# Patient Record
Sex: FEMALE | Race: WHITE | NOT HISPANIC OR LATINO
[De-identification: names, ages, dates, MRNs, and addresses within clinical notes are randomized per-mention and may not be internally consistent; named-entity substitution may affect disease eponyms.]

---

## 2017-08-07 ENCOUNTER — APPOINTMENT (OUTPATIENT)
Dept: GASTROENTEROLOGY | Facility: CLINIC | Age: 62
End: 2017-08-07

## 2021-03-29 DIAGNOSIS — Z01.818 ENCOUNTER FOR OTHER PREPROCEDURAL EXAMINATION: ICD-10-CM

## 2021-04-02 ENCOUNTER — APPOINTMENT (OUTPATIENT)
Dept: DISASTER EMERGENCY | Facility: CLINIC | Age: 66
End: 2021-04-02

## 2021-04-03 LAB — SARS-COV-2 N GENE NPH QL NAA+PROBE: NOT DETECTED

## 2021-06-22 ENCOUNTER — APPOINTMENT (OUTPATIENT)
Dept: ORTHOPEDIC SURGERY | Facility: CLINIC | Age: 66
End: 2021-06-22

## 2021-07-23 ENCOUNTER — APPOINTMENT (OUTPATIENT)
Dept: ORTHOPEDIC SURGERY | Facility: CLINIC | Age: 66
End: 2021-07-23
Payer: OTHER MISCELLANEOUS

## 2021-07-23 VITALS
HEIGHT: 61 IN | HEART RATE: 71 BPM | BODY MASS INDEX: 20.77 KG/M2 | WEIGHT: 110 LBS | DIASTOLIC BLOOD PRESSURE: 76 MMHG | SYSTOLIC BLOOD PRESSURE: 166 MMHG

## 2021-07-23 DIAGNOSIS — M25.561 PAIN IN RIGHT KNEE: ICD-10-CM

## 2021-07-23 DIAGNOSIS — M17.11 UNILATERAL PRIMARY OSTEOARTHRITIS, RIGHT KNEE: ICD-10-CM

## 2021-07-23 DIAGNOSIS — S83.241A OTHER TEAR OF MEDIAL MENISCUS, CURRENT INJURY, RIGHT KNEE, INITIAL ENCOUNTER: ICD-10-CM

## 2021-07-23 DIAGNOSIS — Z86.79 PERSONAL HISTORY OF OTHER DISEASES OF THE CIRCULATORY SYSTEM: ICD-10-CM

## 2021-07-23 DIAGNOSIS — Z87.19 PERSONAL HISTORY OF OTHER DISEASES OF THE DIGESTIVE SYSTEM: ICD-10-CM

## 2021-07-23 PROCEDURE — 99204 OFFICE O/P NEW MOD 45 MIN: CPT

## 2021-07-23 PROCEDURE — 99072 ADDL SUPL MATRL&STAF TM PHE: CPT

## 2021-07-23 PROCEDURE — 73564 X-RAY EXAM KNEE 4 OR MORE: CPT | Mod: RT

## 2021-07-23 RX ORDER — HYALURONATE SODIUM 10 MG/ML
25 SYRINGE (ML) INTRAARTICULAR
Qty: 5 | Refills: 0 | Status: ACTIVE | OUTPATIENT
Start: 2021-07-23

## 2021-07-23 NOTE — HISTORY OF PRESENT ILLNESS
[de-identified] : Ms. FELICE LOPEZ is a 66 year old female presenting for workers comp evaluation of right knee pain.  Patient states on 9/2/2020 she was at work and twisted her knee, causing excruciating pain to the medial aspect of the knee which progressively worsened throughout that day.  She notes swelling at the time of the incident.  Patient went to orthopedist who stated this is likely arthritis flareup and treated conservatively with an aspiration and injection of cortisone in September 2020 without significant relief.  She has tried some therapy and home exercise without relief.  Patient takes over-the-counter Advil without lasting improvement.  Her knee pain is localized medially and posteriorly.  She notes the pain is worse with all weightbearing activity as well as towards the end of the day.  She went for an MRI which she brings to the visit today.  Patient presents to discuss further treatment options.

## 2021-07-23 NOTE — PHYSICAL EXAM
[de-identified] : The patient appears well nourished  and in no apparent distress.  The patient is alert and oriented to person, place, and time.   Affect and mood appear normal.    The head is normocephalic and atraumatic.  The eyes reveal normal sclera and extra ocular muscles are intact. The tongue is midline with no apparent lesions.  Skin shows normal turgor with no evidence of eczema or psoriasis.  No respiratory distress noted.  Sensation grossly intact. [de-identified] : Exam of the right knee shows no effusion.  Mild medial joint line tenderness anteriorly with more significant tenderness posteriorly.  Full extension.  Flexion 140 degrees.  No ligament instability. [de-identified] : X-ray 4 views right knee shows mild medial compartment narrowing.\par \par MRI right knee May 29, 2021 shows medial meniscus tear extending through the root.  Progressed from from prior study.  Arthrosis with joint effusion.

## 2021-07-23 NOTE — CONSULT LETTER
[Dear  ___] : Dear  [unfilled], [Consult Letter:] : I had the pleasure of evaluating your patient, [unfilled]. [Please see my note below.] : Please see my note below. [Consult Closing:] : Thank you very much for allowing me to participate in the care of this patient.  If you have any questions, please do not hesitate to contact me. [Sincerely,] : Sincerely, [FreeTextEntry2] : BEVERLEY SIMMONS MD\par  [FreeTextEntry3] : Samm Wheat MD\par Chief of Joint Replacement\par [facility] \par [facility]\par \par

## 2021-07-23 NOTE — DISCUSSION/SUMMARY
[de-identified] : The patient is a 66-year-old female with a degenerative right knee medial meniscus tear.  Due to the presence of arthritic changes I am recommending exhausting nonoperative treatment due to the less predictable outcome with arthroscopy for meniscus tears in the setting of arthritis.  I think she would benefit potentially from viscosupplementation which was ordered today and we will await for approval before beginning the injections.  In the meantime I would like her to start physical therapy and a prescription was provided.  She will follow-up for the injections once approved.

## 2021-07-29 DIAGNOSIS — M25.551 PAIN IN RIGHT HIP: ICD-10-CM

## 2022-01-06 ENCOUNTER — APPOINTMENT (OUTPATIENT)
Dept: PHYSICAL MEDICINE AND REHAB | Facility: CLINIC | Age: 67
End: 2022-01-06

## 2022-02-18 ENCOUNTER — APPOINTMENT (OUTPATIENT)
Dept: ORTHOPEDIC SURGERY | Facility: CLINIC | Age: 67
End: 2022-02-18

## 2022-06-08 ENCOUNTER — APPOINTMENT (OUTPATIENT)
Dept: DERMATOLOGY | Facility: CLINIC | Age: 67
End: 2022-06-08

## 2022-10-31 ENCOUNTER — APPOINTMENT (OUTPATIENT)
Dept: ORTHOPEDIC SURGERY | Facility: CLINIC | Age: 67
End: 2022-10-31

## 2022-10-31 VITALS — WEIGHT: 110 LBS | HEIGHT: 61 IN | BODY MASS INDEX: 20.77 KG/M2

## 2022-10-31 PROCEDURE — 20550 NJX 1 TENDON SHEATH/LIGAMENT: CPT | Mod: LT

## 2022-10-31 PROCEDURE — 99203 OFFICE O/P NEW LOW 30 MIN: CPT | Mod: 25

## 2022-10-31 NOTE — PHYSICAL EXAM
[Right] : right hand [Left] : left hand [MCP Joint] : MCP joint [A1-Pulley] : A1-pulley [3rd] : 3rd [] : no erythema [FreeTextEntry3] : pretendinous chords to 3 and 4 with no loss of extension  [de-identified] : mild

## 2022-10-31 NOTE — HISTORY OF PRESENT ILLNESS
[4] : 4 [Tightness] : tightness [Nothing helps with pain getting better] : Nothing helps with pain getting better [de-identified] : 67 year old female presenting with LEFT middle finger pain and locking. She states that she was dx with trigger finger and got a CSI on 4/21/22 by another ortho. She was improved until 1 month ago when she banged her finger while mowing the lawn. Her finger is locking again. ALso with RIGHT hand dennis masses. Comes in with MRI.  [] : no [FreeTextEntry1] : left hand  [FreeTextEntry3] : 3/2022  [FreeTextEntry9] : a [de-identified] : 04/2022  [de-identified] : [facility] [de-identified] : mri  [de-identified] : csi 04/21/22

## 2023-02-02 ENCOUNTER — APPOINTMENT (OUTPATIENT)
Dept: ORTHOPEDIC SURGERY | Facility: CLINIC | Age: 68
End: 2023-02-02
Payer: MEDICARE

## 2023-02-02 VITALS — BODY MASS INDEX: 20.77 KG/M2 | HEIGHT: 61 IN | WEIGHT: 110 LBS

## 2023-02-02 PROCEDURE — 20550 NJX 1 TENDON SHEATH/LIGAMENT: CPT | Mod: LT

## 2023-02-02 PROCEDURE — 99213 OFFICE O/P EST LOW 20 MIN: CPT | Mod: 25

## 2023-02-02 NOTE — DISCUSSION/SUMMARY
[de-identified] : Discussed the nature of the diagnosis and risk and benefits of different modalities of treatment.\par Discussed conservative management vs CSI. \par Pt would like another CSI. \par Done today and tolerated well.\par

## 2023-02-02 NOTE — HISTORY OF PRESENT ILLNESS
[Tightness] : tightness [Constant] : constant [de-identified] : 67 year old female followed for LT middle trigger finger and Palmar fascial fibromatosis. She is 3 months s/p trigger CSI. She has mild pain, but still with triggering.  [] : no [FreeTextEntry1] : left middle finger [FreeTextEntry6] : Locking, stiffness [de-identified] : Had an inj last visit for left middle trigger finer reports sxs improved mildly.

## 2023-05-15 ENCOUNTER — APPOINTMENT (OUTPATIENT)
Dept: ORTHOPEDIC SURGERY | Facility: CLINIC | Age: 68
End: 2023-05-15

## 2023-05-31 ENCOUNTER — APPOINTMENT (OUTPATIENT)
Dept: ORTHOPEDIC SURGERY | Facility: CLINIC | Age: 68
End: 2023-05-31
Payer: MEDICARE

## 2023-05-31 DIAGNOSIS — M75.21 BICIPITAL TENDINITIS, RIGHT SHOULDER: ICD-10-CM

## 2023-05-31 DIAGNOSIS — M25.511 PAIN IN RIGHT SHOULDER: ICD-10-CM

## 2023-05-31 PROCEDURE — 20611 DRAIN/INJ JOINT/BURSA W/US: CPT | Mod: RT

## 2023-05-31 PROCEDURE — 73030 X-RAY EXAM OF SHOULDER: CPT | Mod: RT

## 2023-05-31 PROCEDURE — 99214 OFFICE O/P EST MOD 30 MIN: CPT | Mod: 25

## 2023-05-31 RX ORDER — METHYLPREDNISOLONE 4 MG/1
4 TABLET ORAL
Qty: 1 | Refills: 0 | Status: ACTIVE | COMMUNITY
Start: 2023-05-31 | End: 1900-01-01

## 2023-05-31 NOTE — PHYSICAL EXAM
[Right] : right shoulder [No bony abnormalities] : No bony abnormalities [Type 2 acromion] : Type 2 acromion [de-identified] : Constitutional: The general appearance of the patient is well developed, well nourished, no deformities and well groomed. Normal  \par \par Gait: Gait and function is as follows: normal gait.  \par \par Skin: Head and neck visualized skin is normal. Left upper extremity visualized skin is normal. Right upper extremity visualized skin is normal. Thoracic Skin of the thoracic spine shows visualized skin is normal.  \par \par Cardiovascular: palpable radial pulse bilaterally, good capillary refill in digits of the bilateral upper extremities and no temperature or color changes in the bilateral upper extremities.  \par \par Lymphatic: Normal Palpation of lymph nodes in the cervical.  \par \par Neurologic: fine motor control in the bilateral upper extremities is intact. Deep Tendon Reflexes in Upper and Lower Extremities Negative Nick's in the bilateral upper extremities. The patient is oriented to time, place and person. Sensation to light touch intact in the bilateral upper extremities. Mood and Affect is normal.  \par \par Right Shoulder: Inspection of the shoulder/upper arm is as follows: Stable shoulder. Palpation of the shoulder/upper arm is as follows: There is tenderness at the AC joint, proximal biceps tendon and supraspinatus tendon. Range of motion of the shoulder is as follows: Pain with internal rotation, external rotation, abduction and forward flexion. Strength of the shoulder is as follows: Supraspinatus 4/5. External Rotation 4/5. Internal Rotation 4/5. Infraspinatus 5/5 4/5. Deltoid 5/5 Ligament Stability and Special Tests of the shoulder is as follows: Neer test is positive. Jose' test is positive. Speed's test is positive.  \par \par Left Shoulder: Inspection of the shoulder/upper arm is as follows: There is a full, pain-free, stable range of motion of the shoulder with normal strength and no tenderness to palpation.  \par \par Neck: Inspection / Palpation of the cervical spine is as follows: There is a mild restricted range of motion of the cervical spine. Increase tone and mild tenderness to palpation. Stable ROM of cervical spine.  \par \par Back, including spine: Inspection / Palpation of the thoracic/lumbar spine is as follows: There is a full, pain free, stable range of motion of the thoracic spine with a normal tone and not tenderness to palpation..\par

## 2023-05-31 NOTE — DISCUSSION/SUMMARY
[de-identified] : RUE: TTP LHBT, pain with bear hug and belly press\par +Speeds referred to biceps, + Obriens\par Pain and 90/90 ER \par \par 68 y/o F presenting to the office for right shoulder pain from a week ago that started after vacuuming. \par XR of right shoulder reveals no bony abnormalities \par Physical exam is consistent with biceps tendinitis.\par Patient was treated today with ultrasound-guided biceps injection for diagnostic and therapeutic purposes\par MDP prescribed\par follow up as needed.\par \par (1) We discussed a comprehensive treatment plans that included a prescription management plan involving the use of prescription strength medications to include Ibuprofen 600-800 mg TID, versus 500-650 mg Tylenol. We also discussed prescribing topical diclofenac (Voltaren gel) as well as once daily Meloxicam 15 mg. \par \par (2) The patient has More Than One chronic injuries/illnesses as outlined, discussed, and documented by ICD 10 codes listed, as well as the HPI and Plan section. \par \par There is a moderate risk of morbidity with further treatment, especially from use of prescription strength medications and possible side effects of these medications which include upset stomach and cardiac/renal issues with long term use were discussed. \par \par (3) I recommended that the patient follow-up with their medical physician to discuss any significant specific potential issues with long term use such as interactions with current medications or with exacerbation of underlying medical morbidities.\par \par Attestation:\par \par I,Marisa WHAELN'Velazquez, attest that this documentation has been prepared under the direction and in the presence of Provider Eriberto James MD.\par \par The documentation recorded by the scribe, in my presence, accurately reflects the service I personally performed, and the decisions made by me with my edits as appropriate.\par \par Eriberto James MD\par

## 2023-05-31 NOTE — HISTORY OF PRESENT ILLNESS
[de-identified] : 68 y/o F presents to the office for right shoulder from a week ago. She was helping her son with his three little kids and had to vacuum for a few days. While vacuuming, she didn't notice much pain, however, on Friday morning, she reports waking up with right shoulder pain. Pain has been fluctuating. Denies any specific injuries or trauma. Pain is now constant severe 6/10. Denies any \par  numbness or tingling. Pain does improve with rest and ice. Pain is exacerbated with motion overhead. She takes Meloxicam and an anti inflammatory medication. \par \par Patient is RHD, PMH mitral valve prolapse. Pt is currently retired \par

## 2024-02-29 ENCOUNTER — APPOINTMENT (OUTPATIENT)
Dept: ORTHOPEDIC SURGERY | Facility: CLINIC | Age: 69
End: 2024-02-29
Payer: MEDICARE

## 2024-02-29 VITALS — BODY MASS INDEX: 19.83 KG/M2 | HEIGHT: 61 IN | WEIGHT: 105 LBS

## 2024-02-29 DIAGNOSIS — M65.332 TRIGGER FINGER, LEFT MIDDLE FINGER: ICD-10-CM

## 2024-02-29 DIAGNOSIS — M72.0 PALMAR FASCIAL FIBROMATOSIS [DUPUYTREN]: ICD-10-CM

## 2024-02-29 PROCEDURE — 20550 NJX 1 TENDON SHEATH/LIGAMENT: CPT | Mod: LT

## 2024-02-29 PROCEDURE — 99213 OFFICE O/P EST LOW 20 MIN: CPT | Mod: 25

## 2024-02-29 RX ORDER — ATORVASTATIN CALCIUM 80 MG/1
TABLET, FILM COATED ORAL
Refills: 0 | Status: ACTIVE | COMMUNITY

## 2024-02-29 NOTE — HISTORY OF PRESENT ILLNESS
[Tightness] : tightness [Constant] : constant [8] : 8 [7] : 7 [Throbbing] : throbbing [Ice] : ice [de-identified] : 67 year old female followed for LT middle trigger finger and Palmar fascial fibromatosis. She has had 2 injections. Most recent in Feb 2023.  [] : no [FreeTextEntry1] : left middle finger [de-identified] : last CSI on 2/2/23 [FreeTextEntry9] : CSI Otc Regimen: I recommended a broad spectrum sunscreen with a SPF of 30 or higher. I explained that SPF 30 sunscreens block approximately 97 percent of the sun's harmful rays. Sunscreens should be applied at least 15 minutes prior to expected sun exposure and then every 2 hours after that as long as sun exposure continues. If swimming or exercising sunscreen should be reapplied every 45 minutes to an hour after getting wet or sweating. I also recommended a lip balm with a sunscreen as well. Sun protective clothing can be used in lieu of sunscreen but must be worn the entire time you are exposed to the sun's rays. [FreeTextEntry6] : Locking, stiffness Detail Level: Generalized

## 2024-02-29 NOTE — DISCUSSION/SUMMARY
[de-identified] : Discussed the nature of the diagnosis and risk and benefits of different modalities of treatment. LT middle trigger finger. Discussed conservative management vs CSI.  Pt would like another CSI. THhis is #3.  Done today and tolerated well. All questions answered.

## 2024-02-29 NOTE — PROCEDURE
[Tendon Sheath] : tendon sheath [3rd] : 3rd trigger finger [Left] : of the left [Pain] : pain [Inflammation] : inflammation [Alcohol] : alcohol [Ethyl Chloride sprayed topically] : ethyl chloride sprayed topically [___ cc    1%] : Lidocaine ~Vcc of 1%  [Sterile technique used] : sterile technique used [___ cc    10mg] : Triamcinolone (Kenalog) ~Vcc of 10 mg  [] : Patient tolerated procedure well [Risks, benefits, alternatives discussed / Verbal consent obtained] : the risks benefits, and alternatives have been discussed, and verbal consent was obtained

## 2024-04-22 ENCOUNTER — APPOINTMENT (OUTPATIENT)
Dept: ORTHOPEDIC SURGERY | Facility: CLINIC | Age: 69
End: 2024-04-22
Payer: MEDICARE

## 2024-04-22 VITALS — BODY MASS INDEX: 19.45 KG/M2 | WEIGHT: 103 LBS | HEIGHT: 61 IN

## 2024-04-22 PROCEDURE — 99213 OFFICE O/P EST LOW 20 MIN: CPT | Mod: 25

## 2024-04-22 PROCEDURE — 73110 X-RAY EXAM OF WRIST: CPT | Mod: RT

## 2024-04-22 PROCEDURE — 20605 DRAIN/INJ JOINT/BURSA W/O US: CPT | Mod: RT

## 2024-04-22 NOTE — PHYSICAL EXAM
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

## 2024-04-22 NOTE — DISCUSSION/SUMMARY
[de-identified] : Discussed the nature of the diagnosis and risk and benefits of different modalities of treatment. RT wrist x-rays reviewed and discussed. RT CMC arthritis.  Discussed conservative management vs CSI.  Pt would like a CSI. Done today and tolerated well. Custom CMC splint. Rx provided.  All questions answered.

## 2024-04-22 NOTE — HISTORY OF PRESENT ILLNESS
[6] : 6 [4] : 4 [Dull/Aching] : dull/aching [Localized] : localized [Intermittent] : intermittent [Household chores] : household chores [Retired] : Work status: retired [de-identified] : 68 year old female presenting with RIGHT thumb pain. No injury/trauma. Wrose with gripping.  [] : no [FreeTextEntry1] : Rt hand  Rt. hand [FreeTextEntry9] : Tylenol [de-identified] : Movement

## 2024-04-22 NOTE — PROCEDURE
[FreeTextEntry3] :   Medium joint injection was performed of the right CMC joint. The indication for this procedure was pain and inflammation. The site was prepped with alcohol, ethyl chloride sprayed topically, and sterile technique used. An injection of Lidocaine 0.5cc of 1%, Triamcinolone (Kenalog) 0.5cc of 10 mg was used.  Patient tolerated procedure well.   The risks benefits, and alternatives have been discussed, and verbal consent was obtained.   Thumb CMC injection was performed because of pain inflammation and stiffness. Patient has tried OTC's including aspirin, Ibuprofen, Aleve etc. or prescription NSAIDS, and/or exercises at home and/ or physical therapy without satisfactory response. After verbal consent using sterile preparation and technique. The risks, benefits, and alternatives to cortisone injection were explained in full to the patient. Risks outlined include but are not limited to infection, sepsis, bleeding, scarring, skin. discoloration, temporary increase in pain, syncopal episode, failure to resolve symptoms, allergic reaction, symptom recurrence, and elevation of blood sugar in diabetics. Patient understood the risks. All questions were answered. After discussion of options, patient requested an injection. Oral informed consent was obtained, and sterile prep was done of the injection site. Sterile technique was utilized for the procedure including the preparation of the solutions used for the injection. Patient tolerated the procedure well. Advised to ice the injection site this evening. Sterile technique used.

## 2024-06-03 ENCOUNTER — APPOINTMENT (OUTPATIENT)
Dept: ORTHOPEDIC SURGERY | Facility: CLINIC | Age: 69
End: 2024-06-03

## 2024-07-01 ENCOUNTER — APPOINTMENT (OUTPATIENT)
Dept: ORTHOPEDIC SURGERY | Facility: CLINIC | Age: 69
End: 2024-07-01
Payer: MEDICARE

## 2024-07-01 VITALS — BODY MASS INDEX: 19.45 KG/M2 | WEIGHT: 103 LBS | HEIGHT: 61 IN

## 2024-07-01 DIAGNOSIS — M18.11 UNILATERAL PRIMARY OSTEOARTHRITIS OF FIRST CARPOMETACARPAL JOINT, RIGHT HAND: ICD-10-CM

## 2024-07-01 PROCEDURE — 99213 OFFICE O/P EST LOW 20 MIN: CPT

## 2024-07-29 ENCOUNTER — APPOINTMENT (OUTPATIENT)
Dept: ORTHOPEDIC SURGERY | Facility: CLINIC | Age: 69
End: 2024-07-29

## 2024-08-26 ENCOUNTER — APPOINTMENT (OUTPATIENT)
Dept: ORTHOPEDIC SURGERY | Facility: CLINIC | Age: 69
End: 2024-08-26

## 2024-09-16 ENCOUNTER — APPOINTMENT (OUTPATIENT)
Dept: ORTHOPEDIC SURGERY | Facility: CLINIC | Age: 69
End: 2024-09-16

## 2025-07-21 ENCOUNTER — APPOINTMENT (OUTPATIENT)
Dept: ORTHOPEDIC SURGERY | Facility: CLINIC | Age: 70
End: 2025-07-21
Payer: MEDICARE

## 2025-07-21 VITALS — HEIGHT: 61 IN | BODY MASS INDEX: 19.83 KG/M2 | WEIGHT: 105 LBS

## 2025-07-21 DIAGNOSIS — M23.92 UNSPECIFIED INTERNAL DERANGEMENT OF LEFT KNEE: ICD-10-CM

## 2025-07-21 DIAGNOSIS — M11.262 OTHER CHONDROCALCINOSIS, LEFT KNEE: ICD-10-CM

## 2025-07-21 DIAGNOSIS — Z00.00 ENCOUNTER FOR GENERAL ADULT MEDICAL EXAMINATION W/OUT ABNORMAL FINDINGS: ICD-10-CM

## 2025-07-21 PROCEDURE — 73564 X-RAY EXAM KNEE 4 OR MORE: CPT | Mod: LT

## 2025-07-21 PROCEDURE — 20610 DRAIN/INJ JOINT/BURSA W/O US: CPT | Mod: LT

## 2025-07-21 PROCEDURE — 99213 OFFICE O/P EST LOW 20 MIN: CPT | Mod: 25

## 2025-07-28 ENCOUNTER — APPOINTMENT (OUTPATIENT)
Dept: MRI IMAGING | Facility: CLINIC | Age: 70
End: 2025-07-28
Payer: MEDICARE

## 2025-07-28 PROCEDURE — 73721 MRI JNT OF LWR EXTRE W/O DYE: CPT | Mod: LT

## 2025-07-29 ENCOUNTER — TRANSCRIPTION ENCOUNTER (OUTPATIENT)
Age: 70
End: 2025-07-29

## 2025-08-25 ENCOUNTER — APPOINTMENT (OUTPATIENT)
Dept: ORTHOPEDIC SURGERY | Facility: CLINIC | Age: 70
End: 2025-08-25
Payer: MEDICARE

## 2025-08-25 VITALS — HEIGHT: 61 IN | BODY MASS INDEX: 19.83 KG/M2 | WEIGHT: 105 LBS

## 2025-08-25 DIAGNOSIS — S80.02XD CONTUSION OF LEFT KNEE, SUBSEQUENT ENCOUNTER: ICD-10-CM

## 2025-08-25 DIAGNOSIS — M11.262 OTHER CHONDROCALCINOSIS, LEFT KNEE: ICD-10-CM

## 2025-08-25 PROCEDURE — 99214 OFFICE O/P EST MOD 30 MIN: CPT

## 2025-09-04 ENCOUNTER — RESULT REVIEW (OUTPATIENT)
Age: 70
End: 2025-09-04